# Patient Record
Sex: MALE | Race: BLACK OR AFRICAN AMERICAN | NOT HISPANIC OR LATINO | Employment: FULL TIME | URBAN - METROPOLITAN AREA
[De-identification: names, ages, dates, MRNs, and addresses within clinical notes are randomized per-mention and may not be internally consistent; named-entity substitution may affect disease eponyms.]

---

## 2023-01-08 ENCOUNTER — HOSPITAL ENCOUNTER (EMERGENCY)
Facility: HOSPITAL | Age: 38
Discharge: HOME | End: 2023-01-08
Attending: EMERGENCY MEDICINE
Payer: MEDICAID

## 2023-01-08 VITALS
HEART RATE: 70 BPM | BODY MASS INDEX: 29.4 KG/M2 | RESPIRATION RATE: 18 BRPM | TEMPERATURE: 99 F | SYSTOLIC BLOOD PRESSURE: 109 MMHG | OXYGEN SATURATION: 97 % | WEIGHT: 229.06 LBS | DIASTOLIC BLOOD PRESSURE: 74 MMHG | HEIGHT: 74 IN

## 2023-01-08 DIAGNOSIS — S39.012A LUMBAR STRAIN, INITIAL ENCOUNTER: Primary | ICD-10-CM

## 2023-01-08 PROCEDURE — 99283 EMERGENCY DEPT VISIT LOW MDM: CPT

## 2023-01-08 RX ORDER — CYCLOBENZAPRINE HCL 10 MG
10 TABLET ORAL
Status: DISCONTINUED | OUTPATIENT
Start: 2023-01-08 | End: 2023-01-08 | Stop reason: HOSPADM

## 2023-01-08 RX ORDER — KETOROLAC TROMETHAMINE 30 MG/ML
60 INJECTION, SOLUTION INTRAMUSCULAR; INTRAVENOUS
Status: DISCONTINUED | OUTPATIENT
Start: 2023-01-08 | End: 2023-01-08 | Stop reason: HOSPADM

## 2023-01-08 RX ORDER — TIZANIDINE 4 MG/1
4 TABLET ORAL EVERY 6 HOURS PRN
Qty: 20 TABLET | Refills: 0 | Status: SHIPPED | OUTPATIENT
Start: 2023-01-08 | End: 2023-01-18

## 2023-01-08 NOTE — Clinical Note
"Dennys Zapata" Davie was seen and treated in our emergency department on 1/8/2023.  He may return to work on 01/10/2023.       If you have any questions or concerns, please don't hesitate to call.      Gem Manuel NP"

## 2023-01-09 NOTE — FIRST PROVIDER EVALUATION
"Medical screening examination initiated.  I have conducted a focused provider triage encounter, findings are as follows:    Brief history of present illness:  c/o back pain that began 2 week ago. States he was heavy lifting.     Vitals:    01/08/23 1914   BP: 108/69   BP Location: Left arm   Patient Position: Sitting   Pulse: 71   Resp: 20   Temp: 98.5 °F (36.9 °C)   TempSrc: Oral   SpO2: 97%   Weight: 103.9 kg (229 lb 0.9 oz)   Height: 6' 2.02" (1.88 m)       Pertinent physical exam:  alert, ambulatory into triage, non-labored breathing.     Brief workup plan:  imaging, medication     Preliminary workup initiated; this workup will be continued and followed by the physician or advanced practice provider that is assigned to the patient when roomed.  "

## 2023-01-09 NOTE — ED PROVIDER NOTES
Encounter Date: 1/8/2023       History     Chief Complaint   Patient presents with    Back Pain     Lower back pain x 2 weeks.      Patient is a 31 year old male with no significant PMH who presents to ER with c/o lower back pain x 2 week. Patient reports that pain started after some heavy lifting and yard work two weeks ago. He denies numbness or tingling to extremities. He denies perianal numbness, saddle anesthesia, or loss of bowel/bladder. He has been ambulatory daily. He reports pain worse with movement.     The history is provided by the patient. No  was used.   Back Pain   This is a new problem. The current episode started several days ago. The problem occurs daily. The problem has been waxing and waning. The pain is associated with lifting heavy objects. The pain is present in the lumbar spine. The quality of the pain is described as aching. The pain does not radiate. The pain is at a severity of 4/10. The symptoms are aggravated by bending, twisting and certain positions. The pain is The same all the time. Pertinent negatives include no chest pain, no fever, no numbness, no weight loss, no headaches, no abdominal pain, no abdominal swelling, no bowel incontinence, no perianal numbness, no bladder incontinence, no dysuria, no pelvic pain, no leg pain, no paresthesias, no paresis, no tingling and no weakness. He has tried nothing for the symptoms. The treatment provided no relief.   Review of patient's allergies indicates:  No Known Allergies  History reviewed. No pertinent past medical history.  History reviewed. No pertinent surgical history.  History reviewed. No pertinent family history.  Social History     Tobacco Use    Smoking status: Never    Smokeless tobacco: Never     Review of Systems   Constitutional:  Negative for fever and weight loss.   HENT:  Negative for congestion, rhinorrhea and sore throat.    Eyes:  Negative for photophobia and visual disturbance.   Respiratory:   Negative for shortness of breath.    Cardiovascular:  Negative for chest pain.   Gastrointestinal:  Negative for abdominal pain, bowel incontinence and nausea.   Genitourinary:  Negative for bladder incontinence, dysuria, frequency, hematuria, pelvic pain and urgency.   Musculoskeletal:  Positive for back pain. Negative for myalgias, neck pain and neck stiffness.   Skin:  Negative for rash.   Neurological:  Negative for dizziness, tingling, weakness, numbness, headaches and paresthesias.   Hematological:  Does not bruise/bleed easily.   Psychiatric/Behavioral:  Negative for behavioral problems.    All other systems reviewed and are negative.    Physical Exam     Initial Vitals [01/08/23 1914]   BP Pulse Resp Temp SpO2   108/69 71 20 98.5 °F (36.9 °C) 97 %      MAP       --         Physical Exam    Nursing note and vitals reviewed.  Constitutional: Vital signs are normal. He appears well-developed and well-nourished.   HENT:   Head: Normocephalic.   Right Ear: Hearing and tympanic membrane normal.   Left Ear: Hearing and tympanic membrane normal.   Nose: Nose normal.   Mouth/Throat: Uvula is midline, oropharynx is clear and moist and mucous membranes are normal.   Eyes: Conjunctivae are normal. Pupils are equal, round, and reactive to light.   Neck:   Normal range of motion.   Full passive range of motion without pain.     Cardiovascular:  Regular rhythm, normal heart sounds and normal pulses.           Pulmonary/Chest: Effort normal and breath sounds normal.   Musculoskeletal:      Cervical back: Normal, full passive range of motion without pain and normal range of motion. No spinous process tenderness or muscular tenderness.      Thoracic back: Normal.      Lumbar back: Tenderness present. Normal range of motion. Negative right straight leg raise test and negative left straight leg raise test.      Comments: All other adjacent joints normal      Lymphadenopathy:     He has no cervical adenopathy.   Neurological: He  is alert. GCS eye subscore is 4. GCS verbal subscore is 5. GCS motor subscore is 6.   Skin: Skin is warm, dry and intact. Capillary refill takes less than 2 seconds.       ED Course   Procedures  Labs Reviewed - No data to display       Imaging Results              X-Ray Lumbar Spine Ap And Lateral (Final result)  Result time 01/08/23 19:44:31      Final result by Nabor Mercer MD (01/08/23 19:44:31)                   Impression:      No acute osseous abnormality identified.      Electronically signed by: Nabor Mercer  Date:    01/08/2023  Time:    19:44               Narrative:    EXAMINATION:  XR LUMBAR SPINE AP AND LATERAL    CLINICAL HISTORY:  back pain;    TECHNIQUE:  Two-view    COMPARISON:  None available    FINDINGS:  Lumbar vertebrae stature and alignment is preserved. Intervertebral disc spaces are unremarkable. No acute fracture or subluxation identified.                                       Medications   cyclobenzaprine tablet 10 mg (10 mg Oral Not Given 1/8/23 1915)   ketorolac injection 60 mg (60 mg Intramuscular Not Given 1/8/23 1915)     Medical Decision Making:   Initial Assessment:   Awake and alert, NAD  Differential Diagnosis:   Muscle strain, lumbar radiculopathy, lumbar strain, lumbar fracture   Clinical Tests:   Radiological Study: Ordered and Reviewed  ED Management:  MDM  History obtained from patient.   Co-morbidities and/or factors adding to the complexity or risk for the patient?: None  Differential diagnoses: Muscle strain, lumbar radiculopathy, lumbar strain, lumbar fracture   Decision to obtain previous or outside records?: NO  Chart Review (nursing home, outside records, CareEverywhere):   Review of RX medications/new RX prescribed by me?: Reviewed and discussed new RX of tizanidine   My EKG Interpretation: see above  Labs/imaging/other tests obtained/considered (risk/benefits of testing discussed): XR lumbar spine  Labs/tests intepretation: No acute abnormality  My independent  imaging interpretation:   Treatment/interventions, IV fluids, IV medications:   Complex management (IV controlled substances, went to OR, DNR, meds requiring monitoring, transfer, etc)?:   Workup/treatment affected by social determinants of health?: none   Point of care US done/interpretation:   Consults/radiologist/EMS/social work/family discussion/alternate history:   Advanced care planning/end of life discussion:   Shared decision making: Shared decision making with patient.   ETOH/smoking/drug cessation discussion: N/A  Dispo: Discharge home.                          Clinical Impression:   Final diagnoses:  [S39.012A] Lumbar strain, initial encounter (Primary)        ED Disposition Condition    Discharge Stable          ED Prescriptions       Medication Sig Dispense Start Date End Date Auth. Provider    tiZANidine (ZANAFLEX) 4 MG tablet Take 1 tablet (4 mg total) by mouth every 6 (six) hours as needed (Back pain). 20 tablet 1/8/2023 1/18/2023 Gem Manuel NP          Follow-up Information       Follow up With Specialties Details Why Contact Info    Primary Care Provider  Schedule an appointment as soon as possible for a visit  As needed, If symptoms worsen              Gem Manuel NP  01/08/23 [id number]